# Patient Record
Sex: MALE | Race: BLACK OR AFRICAN AMERICAN | NOT HISPANIC OR LATINO | Employment: STUDENT | URBAN - METROPOLITAN AREA
[De-identification: names, ages, dates, MRNs, and addresses within clinical notes are randomized per-mention and may not be internally consistent; named-entity substitution may affect disease eponyms.]

---

## 2017-01-11 ENCOUNTER — TELEPHONE (OUTPATIENT)
Dept: FAMILY MEDICINE CLINIC | Facility: CLINIC | Age: 22
End: 2017-01-11

## 2017-01-18 ENCOUNTER — OFFICE VISIT (OUTPATIENT)
Dept: FAMILY MEDICINE CLINIC | Facility: CLINIC | Age: 22
End: 2017-01-18

## 2017-01-18 VITALS
SYSTOLIC BLOOD PRESSURE: 110 MMHG | WEIGHT: 181 LBS | DIASTOLIC BLOOD PRESSURE: 70 MMHG | HEART RATE: 69 BPM | HEIGHT: 75 IN | OXYGEN SATURATION: 97 % | BODY MASS INDEX: 22.5 KG/M2

## 2017-01-18 DIAGNOSIS — L64.9 MALE PATTERN BALDNESS: Primary | ICD-10-CM

## 2017-01-18 PROCEDURE — 99203 OFFICE O/P NEW LOW 30 MIN: CPT | Performed by: PHYSICIAN ASSISTANT

## 2017-01-18 RX ORDER — FINASTERIDE 1 MG/1
1 TABLET, FILM COATED ORAL DAILY
Qty: 30 TABLET | Refills: 2 | Status: SHIPPED | OUTPATIENT
Start: 2017-01-18 | End: 2022-09-06

## 2017-01-18 NOTE — MR AVS SNAPSHOT
Quinn Ugarte   2017 1:30 PM   Office Visit    Dept Phone:  [phone]   Encounter #:  [id number]    Provider:  TAMANNA Wong   Department:  [facility] MEDICAL [facility] FAMILY MEDICINE                Your Full Care Plan              Today's Medication Changes          These changes are accurate as of: 17  3:09 PM.  If you have any questions, ask your nurse or doctor.               New Medication(s)Ordered:     finasteride 1 MG tablet   Commonly known as:  PROPECIA   Take 1 tablet by mouth Daily.   Started by:  TAMANNA Wong            Where to Get Your Medications      These medications were sent to [address] - [address] [facility] - [phone]  - [phone] [address] [address]     Phone:  [phone]     finasteride 1 MG tablet                  Your Updated Medication List          This list is accurate as of: 17  3:09 PM.  Always use your most recent med list.                finasteride 1 MG tablet   Commonly known as:  PROPECIA   Take 1 tablet by mouth Daily.               You Were Diagnosed With        Codes Comments    Male pattern baldness    -  Primary ICD-10-CM: L64.9  ICD-9-CM: 704.09       Instructions     None    Patient Instructions History      Upcoming Appointments     Visit Type Date Time Department    NEW PATIENT 2017  1:30 PM [facility]      [employer] Signup     [facility] [employer] allows you to send messages to your doctor, view your test results, renew your prescriptions, schedule appointments, and more. To sign up, go to [employer] and click on the Sign Up Now link in the New User? box. Enter your [employer] Activation Code exactly as it appears below along with the last four digits of your Social Security Number and your Date of Birth () to complete the sign-up process. If you do not sign up before the expiration date, you must  "request a new code.    [employer] Activation Code: [email or web address]  Expires: 2/1/2017  3:09 PM    If you have questions, you can email [email or web address] or call [phone] to talk to our [employer] staff. Remember, [employer] is NOT to be used for urgent needs. For medical emergencies, dial 911.               Other Info from Your Visit           Allergies     No Known Allergies      Reason for Visit     Establish Care     Alopecia           Vital Signs     Blood Pressure Pulse Height Weight Oxygen Saturation Body Mass Index    110/70 (BP Location: Right arm, Patient Position: Sitting) 69 75\" (190.5 cm) 181 lb (82.1 kg) 97% 22.62 kg/m2    Smoking Status                   Never Smoker           Problems and Diagnoses Noted     Male pattern baldness    -  Primary        "

## 2017-01-18 NOTE — PROGRESS NOTES
Subjective   Quinn Ugarte is a 21 y.o. male    History of Present Illness  Patient is 21-year-old male comes in complaining of her loss, noticed around age 18 started losing her around the crown  of his head.  He states this is been increasingly getting worse over the last 3 years.  His father started losing his hair at the same time, he comes in today wanting to discuss options for her loss.  He's used no over-the-counter medication for her loss.  He complains of noticeable thinning of hair on top of head, decreasing hair line.  Patient denies any medical problems, no hypertension or diabetes, thyroid problems.          The following portions of the patient's history were reviewed and updated as appropriate: allergies, current medications, past social history and problem list    Review of Systems   Constitutional: Negative for fatigue and unexpected weight change.   Respiratory: Negative for cough, chest tightness and shortness of breath.    Cardiovascular: Negative for chest pain, palpitations and leg swelling.   Gastrointestinal: Negative for nausea.   Skin: Negative for color change and rash.   Neurological: Negative for dizziness, syncope, weakness and headaches.       Objective     Vitals:    01/18/17 1444   BP: 110/70   Pulse: 69   SpO2: 97%       Physical Exam   Constitutional: He appears well-developed and well-nourished.   HENT:   Head:       Neck: No JVD present.   Cardiovascular: Normal rate, regular rhythm, normal heart sounds, intact distal pulses and normal pulses.    No murmur heard.  Pulmonary/Chest: Effort normal and breath sounds normal. No respiratory distress.   Abdominal: Soft. Bowel sounds are normal. There is no hepatosplenomegaly. There is no tenderness.   Musculoskeletal: He exhibits no edema.   Skin: Skin is warm and dry.   Nursing note and vitals reviewed.      Assessment/Plan     Diagnoses and all orders for this visit:    Male pattern baldness    Other orders  -     finasteride  (PROPECIA) 1 MG tablet; Take 1 tablet by mouth Daily.     patient follow-up in 8 weeks, risks of medication discussed patient understands he is to follow-up, pictures taken in the office (8 weeks

## 2017-03-15 ENCOUNTER — TELEPHONE (OUTPATIENT)
Dept: FAMILY MEDICINE CLINIC | Facility: CLINIC | Age: 22
End: 2017-03-15

## 2022-09-06 ENCOUNTER — OFFICE VISIT (OUTPATIENT)
Dept: FAMILY MEDICINE CLINIC | Facility: CLINIC | Age: 27
End: 2022-09-06

## 2022-09-06 VITALS
HEART RATE: 97 BPM | SYSTOLIC BLOOD PRESSURE: 122 MMHG | OXYGEN SATURATION: 97 % | DIASTOLIC BLOOD PRESSURE: 76 MMHG | BODY MASS INDEX: 31.56 KG/M2 | WEIGHT: 253.8 LBS | HEIGHT: 75 IN

## 2022-09-06 DIAGNOSIS — Z13.0 SCREENING FOR DEFICIENCY ANEMIA: ICD-10-CM

## 2022-09-06 DIAGNOSIS — Z13.220 SCREENING FOR HYPERLIPIDEMIA: ICD-10-CM

## 2022-09-06 DIAGNOSIS — L64.9 MALE PATTERN BALDNESS: ICD-10-CM

## 2022-09-06 DIAGNOSIS — N52.9 ERECTILE DYSFUNCTION, UNSPECIFIED ERECTILE DYSFUNCTION TYPE: ICD-10-CM

## 2022-09-06 DIAGNOSIS — R53.81 MALAISE: ICD-10-CM

## 2022-09-06 DIAGNOSIS — Z13.29 SCREENING FOR ENDOCRINE DISORDER: ICD-10-CM

## 2022-09-06 DIAGNOSIS — R68.82 LOW LIBIDO: Primary | ICD-10-CM

## 2022-09-06 PROCEDURE — 99204 OFFICE O/P NEW MOD 45 MIN: CPT | Performed by: FAMILY MEDICINE

## 2022-09-06 RX ORDER — FINASTERIDE 1 MG/1
1 TABLET, FILM COATED ORAL DAILY
Qty: 90 TABLET | Refills: 2 | Status: SHIPPED | OUTPATIENT
Start: 2022-09-06

## 2022-09-06 NOTE — PROGRESS NOTES
New Patient Office Visit      Patient Name: Quinn Ugarte  : 1995   MRN: [id number]     Chief Complaint:    Chief Complaint   Patient presents with   • testosterone   • Erectile Dysfunction       Subjective   History of Present Illness    History of Present Illness: Quinn Ugarte is a 27 y.o. male who is here today to establish care.    Quinn Ugarte is a 25-year-old male who presents today to establish care. He has not been to a doctor in a few years.    He reports he has been having a sexual issue for a while. He has a girlfriend and they have tried sex, but he cannot function at all. He reports that even pornography does not give him an erection. He denies having morning erections. He reports this has been going on since at least 2022. He reports he was talking to a lot of guys at the gym and they told him it was probably low testosterone because he used to take a lot of SARMs. He denies noticing any swelling or shrinkage in his testicles. He reports his energy level has been down. He reports he has not been motivated to work out or go to the gym. He reports he has been feeling tired lately. He reports he has been sleeping a lot more than usual. He reports he has picked up a lot of weight over the last few years. He reports he has been picking up more fat than usual this year. He reports he has been eating a lot, sleeping a lot, and has not been feeling motivated to do anything. He reports he was having the same problems in 2022 when he was at the emergency room. He reports he was working out and his heart was beating real fast and he was sweating like he was about to have a heart attack. He reports he was sweating more than usual, went to the bathroom, and was vomiting. He reports he was having the erectile dysfunction at that point.    He denies smoking. He reports he drinks alcohol on the weekends, but nothing daily. He reports he drinks more than 10 drinks on the weekends. He reports  he goes to the bars every other weekend.     He reports he sleeps more than usual. He does not get restful sleep during the night. He reports he works most of the time. He is unsure if he snores. He reports he feels like there is a little depression going on.     He reports he has had problems with erections in the past, but he thinks it is from the SARMs. He reports he does not need to be on finasteride again.    Previous primary care: Previously saw Richi Tillman, most recently in 2017.    Chronic health conditions:  Male pattern baldness: Previously on finasteride 1 mg daily    Other physicians currently involved in patient's care:  Patient Care Team:  Cole Israel DO as PCP - General (Family Medicine)    Acute Concerns:  HPI  This patient is accompanied by their self who contributes to the history of their care.    The following portions of the patient's history were reviewed and updated as appropriate: allergies, current medications, past family history, past medical history, past social history, past surgical history and problem list.    Subjective      Review of Systems:   Review of Systems - See HPI and new patient paperwork scanned into chart    Past Medical History:   Past Medical History:   Diagnosis Date   • Depression        Past Surgical History: History reviewed. No pertinent surgical history.    Family History:   Family History   Problem Relation Age of Onset   • Drug abuse Mother    • No Known Problems Father        Social History:   Social History     Socioeconomic History   • Marital status: Single   Tobacco Use   • Smoking status: Never Smoker   • Smokeless tobacco: Never Used   Vaping Use   • Vaping Use: Never used   Substance and Sexual Activity   • Alcohol use: Yes     Comment: Occ   • Drug use: No   • Sexual activity: Defer       Tobacco History:   Social History     Tobacco Use   Smoking Status Never Smoker   Smokeless Tobacco Never Used       Medications:     Current Outpatient  "Medications:   •  finasteride (Propecia) 1 MG tablet, Take 1 tablet by mouth Daily., Disp: 90 tablet, Rfl: 2    Allergies:   No Known Allergies    Objective   Objective     Physical Exam:  Vital Signs:   Vitals:    09/06/22 1432   BP: 122/76   Pulse: 97   SpO2: 97%   Weight: 115 kg (253 lb 12.8 oz)   Height: 190.5 cm (75\")     Body mass index is 31.72 kg/m².     Physical Exam  Nursing note reviewed  Const: NAD, A&Ox4, Pleasant, Cooperative  Eyes: EOMI, no conjunctivitis  ENT: No nasal discharge present, neck supple  Cardiac: Regular rate and rhythm, no cyanosis  Resp: Respiratory rate within normal limits, no increased work of breathing, no audible wheezing or retractions noted  GI: No distention or ascites  MSK: Motor and sensation grossly intact in bilateral upper extremities  Neurologic: CN II-XII grossly intact  Psych: Appropriate mood and behavior.  Skin: Warm, dry  Procedures/Radiology     Procedures  No radiology results for the last 7 days     Assessment & Plan   Assessment / Plan      Assessment/Plan:   Problems Addressed This Visit  Diagnoses and all orders for this visit:    1. Low libido (Primary)  -     Testosterone; Future  -     Prolactin; Future  -     TSH Rfx On Abnormal To Free T4; Future  -     Urinalysis With Microscopic If Indicated (No Culture) - Urine, Clean Catch; Future  -     Lipid Panel; Future  -     Hemoglobin A1c; Future  -     Comprehensive Metabolic Panel; Future  -     CBC & Differential; Future  -     Vitamin D 25 Hydroxy; Future  -     Vitamin B12; Future  -     PSA DIAGNOSTIC; Future    2. Malaise  -     Testosterone; Future  -     Prolactin; Future  -     TSH Rfx On Abnormal To Free T4; Future  -     Urinalysis With Microscopic If Indicated (No Culture) - Urine, Clean Catch; Future  -     Lipid Panel; Future  -     Hemoglobin A1c; Future  -     Comprehensive Metabolic Panel; Future  -     CBC & Differential; Future  -     Vitamin D 25 Hydroxy; Future  -     Vitamin B12; Future  - "     PSA DIAGNOSTIC; Future    3. Erectile dysfunction, unspecified erectile dysfunction type  -     Testosterone; Future  -     Prolactin; Future  -     TSH Rfx On Abnormal To Free T4; Future  -     Urinalysis With Microscopic If Indicated (No Culture) - Urine, Clean Catch; Future  -     Lipid Panel; Future  -     Hemoglobin A1c; Future  -     Comprehensive Metabolic Panel; Future  -     CBC & Differential; Future  -     Vitamin D 25 Hydroxy; Future  -     Vitamin B12; Future  -     PSA DIAGNOSTIC; Future    4. Screening for deficiency anemia    5. Screening for endocrine disorder    6. Screening for hyperlipidemia    7. Male pattern baldness  -     finasteride (Propecia) 1 MG tablet; Take 1 tablet by mouth Daily.  Dispense: 90 tablet; Refill: 2      Problem List Items Addressed This Visit    None     Visit Diagnoses     Low libido    -  Primary    Relevant Orders    Testosterone (Completed)    Prolactin (Completed)    TSH Rfx On Abnormal To Free T4 (Completed)    Urinalysis With Microscopic If Indicated (No Culture) - Urine, Clean Catch (Completed)    Lipid Panel (Completed)    Hemoglobin A1c (Completed)    Comprehensive Metabolic Panel (Completed)    CBC & Differential (Completed)    Vitamin D 25 Hydroxy (Completed)    Vitamin B12 (Completed)    PSA DIAGNOSTIC (Completed)    Malaise        Relevant Orders    Testosterone (Completed)    Prolactin (Completed)    TSH Rfx On Abnormal To Free T4 (Completed)    Urinalysis With Microscopic If Indicated (No Culture) - Urine, Clean Catch (Completed)    Lipid Panel (Completed)    Hemoglobin A1c (Completed)    Comprehensive Metabolic Panel (Completed)    CBC & Differential (Completed)    Vitamin D 25 Hydroxy (Completed)    Vitamin B12 (Completed)    PSA DIAGNOSTIC (Completed)    Erectile dysfunction, unspecified erectile dysfunction type        Relevant Orders    Testosterone (Completed)    Prolactin (Completed)    TSH Rfx On Abnormal To Free T4 (Completed)    Urinalysis  With Microscopic If Indicated (No Culture) - Urine, Clean Catch (Completed)    Lipid Panel (Completed)    Hemoglobin A1c (Completed)    Comprehensive Metabolic Panel (Completed)    CBC & Differential (Completed)    Vitamin D 25 Hydroxy (Completed)    Vitamin B12 (Completed)    PSA DIAGNOSTIC (Completed)    Screening for deficiency anemia        Screening for endocrine disorder        Screening for hyperlipidemia        Male pattern baldness        Relevant Medications    finasteride (Propecia) 1 MG tablet          1. Low libido.  - He has symptoms possibly consistent with low testosterone, also consistent with a number of other issues including depression, could be consistent with diabetes or severe hyperlipidemia. His labs in 05/2022 generally looked okay. He does state that he has gained a lot of weight over the last few years. In 2017, he was at 181 pounds, today he is at 253 pounds. He is also taking over-the-counter supplements called SARMs, which will require some research as it relates to his current symptoms. Lab orders have been placed. He will follow up in a couple of weeks to go over his lab results and discuss the plan.    We will plan to obtain previous records both for chronic preventative care as well as those related to the current episode of care.  Any records that the patient brought with him today were reviewed personally by me during the visit today and will be scanned into the chart for posterity.    Discussed the nature of the disease including relevant anatomy & expected clinical course, risks, complications, implications, management, safe and proper use of medications. Plan of care reviewed with patient at the conclusion of today's visit. Education was provided regarding diagnosis and management.  Patient verbalizes understanding of and agreement with management plan. Encouraged therapeutic lifestyle changes including low calorie diet with plenty of fruits and vegetables, daily exercise,  medication compliance, and keeping scheduled follow up appointments with me and any other providers. Encouraged patient to have appointment for complete physical, fasting labs, appropriate screenings, and immunizations on an annual basis. Discussed extended office hours, shared call, and appropriate use of the ER. Discussed generally we do not prescribe chronic controlled substances from this office. Appropriate referrals will be made to pain management and psychiatry if needed. Stressed the importance and expectation of medical compliance with plan of care, medications, and follow up appointments.    Patient Instructions   Sarms      Follow Up:   Return in about 2 weeks (around 9/20/2022) for video visit.    [facility]     DO AKANKSHA Gonzalez RD  [facility]IMARY [facility]  [address] [facility] [address]  Fax [phone]  Phone [phone]      Transcribed from ambient dictation for Cole Israel DO by BECKY VILLANUEVA.  09/06/22   15:45 EDT    Patient verbalized consent to the visit recording.  I have personally performed the services described in this document as transcribed by the above individual, and it is both accurate and complete.  Cole Israel DO  9/14/2022  11:03 EDT

## 2022-09-07 ENCOUNTER — LAB (OUTPATIENT)
Dept: LAB | Facility: HOSPITAL | Age: 27
End: 2022-09-07

## 2022-09-07 DIAGNOSIS — R68.82 LOW LIBIDO: ICD-10-CM

## 2022-09-07 DIAGNOSIS — R53.81 MALAISE: ICD-10-CM

## 2022-09-07 DIAGNOSIS — N52.9 ERECTILE DYSFUNCTION, UNSPECIFIED ERECTILE DYSFUNCTION TYPE: ICD-10-CM

## 2022-09-07 LAB
25(OH)D3 SERPL-MCNC: 21.7 NG/ML (ref 30–100)
ALBUMIN SERPL-MCNC: 4.4 G/DL (ref 3.5–5.2)
ALBUMIN/GLOB SERPL: 1.4 G/DL
ALP SERPL-CCNC: 74 U/L (ref 39–117)
ALT SERPL W P-5'-P-CCNC: 25 U/L (ref 1–41)
ANION GAP SERPL CALCULATED.3IONS-SCNC: 10.7 MMOL/L (ref 5–15)
AST SERPL-CCNC: 19 U/L (ref 1–40)
BACTERIA UR QL AUTO: ABNORMAL /HPF
BASOPHILS # BLD AUTO: 0.09 10*3/MM3 (ref 0–0.2)
BASOPHILS NFR BLD AUTO: 0.9 % (ref 0–1.5)
BILIRUB SERPL-MCNC: 0.8 MG/DL (ref 0–1.2)
BILIRUB UR QL STRIP: NEGATIVE
BUN SERPL-MCNC: 13 MG/DL (ref 6–20)
BUN/CREAT SERPL: 10.2 (ref 7–25)
CALCIUM SPEC-SCNC: 10 MG/DL (ref 8.6–10.5)
CHLORIDE SERPL-SCNC: 99 MMOL/L (ref 98–107)
CHOLEST SERPL-MCNC: 148 MG/DL (ref 0–200)
CLARITY UR: CLEAR
CO2 SERPL-SCNC: 27.3 MMOL/L (ref 22–29)
COD CRY URNS QL: ABNORMAL /HPF
COLOR UR: ABNORMAL
CREAT SERPL-MCNC: 1.27 MG/DL (ref 0.76–1.27)
DEPRECATED RDW RBC AUTO: 41.6 FL (ref 37–54)
EGFRCR SERPLBLD CKD-EPI 2021: 79.4 ML/MIN/1.73
EOSINOPHIL # BLD AUTO: 0.35 10*3/MM3 (ref 0–0.4)
EOSINOPHIL NFR BLD AUTO: 3.7 % (ref 0.3–6.2)
ERYTHROCYTE [DISTWIDTH] IN BLOOD BY AUTOMATED COUNT: 12.7 % (ref 12.3–15.4)
GLOBULIN UR ELPH-MCNC: 3.1 GM/DL
GLUCOSE SERPL-MCNC: 86 MG/DL (ref 65–99)
GLUCOSE UR STRIP-MCNC: NEGATIVE MG/DL
HCT VFR BLD AUTO: 44.4 % (ref 37.5–51)
HDLC SERPL-MCNC: 52 MG/DL (ref 40–60)
HGB BLD-MCNC: 15.3 G/DL (ref 13–17.7)
HGB UR QL STRIP.AUTO: NEGATIVE
HYALINE CASTS UR QL AUTO: ABNORMAL /LPF
IMM GRANULOCYTES # BLD AUTO: 0.04 10*3/MM3 (ref 0–0.05)
IMM GRANULOCYTES NFR BLD AUTO: 0.4 % (ref 0–0.5)
KETONES UR QL STRIP: ABNORMAL
LDLC SERPL CALC-MCNC: 73 MG/DL (ref 0–100)
LDLC/HDLC SERPL: 1.34 {RATIO}
LEUKOCYTE ESTERASE UR QL STRIP.AUTO: NEGATIVE
LYMPHOCYTES # BLD AUTO: 2.87 10*3/MM3 (ref 0.7–3.1)
LYMPHOCYTES NFR BLD AUTO: 30 % (ref 19.6–45.3)
MCH RBC QN AUTO: 30.8 PG (ref 26.6–33)
MCHC RBC AUTO-ENTMCNC: 34.5 G/DL (ref 31.5–35.7)
MCV RBC AUTO: 89.5 FL (ref 79–97)
MONOCYTES # BLD AUTO: 0.65 10*3/MM3 (ref 0.1–0.9)
MONOCYTES NFR BLD AUTO: 6.8 % (ref 5–12)
NEUTROPHILS NFR BLD AUTO: 5.57 10*3/MM3 (ref 1.7–7)
NEUTROPHILS NFR BLD AUTO: 58.2 % (ref 42.7–76)
NITRITE UR QL STRIP: NEGATIVE
NRBC BLD AUTO-RTO: 0 /100 WBC (ref 0–0.2)
PH UR STRIP.AUTO: 5.5 [PH] (ref 5–8)
PLATELET # BLD AUTO: 340 10*3/MM3 (ref 140–450)
PMV BLD AUTO: 9.9 FL (ref 6–12)
POTASSIUM SERPL-SCNC: 4.4 MMOL/L (ref 3.5–5.2)
PROLACTIN SERPL-MCNC: 17.1 NG/ML (ref 4.04–15.2)
PROT SERPL-MCNC: 7.5 G/DL (ref 6–8.5)
PROT UR QL STRIP: ABNORMAL
PSA SERPL-MCNC: 0.32 NG/ML (ref 0–4)
RBC # BLD AUTO: 4.96 10*6/MM3 (ref 4.14–5.8)
RBC # UR STRIP: ABNORMAL /HPF
REF LAB TEST METHOD: ABNORMAL
SODIUM SERPL-SCNC: 137 MMOL/L (ref 136–145)
SP GR UR STRIP: >=1.03 (ref 1–1.03)
SQUAMOUS #/AREA URNS HPF: ABNORMAL /HPF
TESTOST SERPL-MCNC: 275 NG/DL (ref 249–836)
TRIGL SERPL-MCNC: 132 MG/DL (ref 0–150)
TSH SERPL DL<=0.05 MIU/L-ACNC: 2.16 UIU/ML (ref 0.27–4.2)
UROBILINOGEN UR QL STRIP: ABNORMAL
VIT B12 BLD-MCNC: 611 PG/ML (ref 211–946)
VLDLC SERPL-MCNC: 23 MG/DL (ref 5–40)
WBC # UR STRIP: ABNORMAL /HPF
WBC NRBC COR # BLD: 9.57 10*3/MM3 (ref 3.4–10.8)

## 2022-09-07 PROCEDURE — 80050 GENERAL HEALTH PANEL: CPT

## 2022-09-07 PROCEDURE — 80061 LIPID PANEL: CPT

## 2022-09-07 PROCEDURE — 84146 ASSAY OF PROLACTIN: CPT

## 2022-09-07 PROCEDURE — 82607 VITAMIN B-12: CPT

## 2022-09-07 PROCEDURE — 84403 ASSAY OF TOTAL TESTOSTERONE: CPT

## 2022-09-07 PROCEDURE — 82306 VITAMIN D 25 HYDROXY: CPT

## 2022-09-07 PROCEDURE — 83036 HEMOGLOBIN GLYCOSYLATED A1C: CPT

## 2022-09-07 PROCEDURE — 81001 URINALYSIS AUTO W/SCOPE: CPT

## 2022-09-07 PROCEDURE — 84153 ASSAY OF PSA TOTAL: CPT

## 2022-09-08 LAB — HBA1C MFR BLD: 5.4 % (ref 4.8–5.6)

## 2022-09-23 ENCOUNTER — TELEMEDICINE (OUTPATIENT)
Dept: FAMILY MEDICINE CLINIC | Facility: CLINIC | Age: 27
End: 2022-09-23

## 2022-09-23 DIAGNOSIS — R68.82 LOW LIBIDO: Primary | ICD-10-CM

## 2022-09-23 DIAGNOSIS — E55.9 VITAMIN D DEFICIENCY: ICD-10-CM

## 2022-09-23 DIAGNOSIS — R79.89 ELEVATED PROLACTIN LEVEL: ICD-10-CM

## 2022-09-23 PROCEDURE — 99213 OFFICE O/P EST LOW 20 MIN: CPT | Performed by: FAMILY MEDICINE

## 2022-09-23 RX ORDER — ERGOCALCIFEROL 1.25 MG/1
50000 CAPSULE ORAL WEEKLY
Qty: 12 CAPSULE | Refills: 0 | Status: SHIPPED | OUTPATIENT
Start: 2022-09-23 | End: 2022-10-19

## 2022-09-26 NOTE — PROGRESS NOTES
Subjective   Quinn Ugarte is a 27 y.o. male.     Chief Complaint   Patient presents with   • Follow-up     Low libido, lab review       History of Present Illness     Quinn Ugarte presents today for   Chief Complaint   Patient presents with   • Follow-up     Low libido, lab review       Quinn is seen today via video visit for follow-up on labs.  He confirms that he is located in the [facility].  He has no new complaints since he established care on 9/6.    You have chosen to receive care through a telehealth visit.  Do you consent to use a video/audio connection for your medical care today? Yes    The following portions of the patient's history were reviewed and updated as appropriate: allergies, current medications, past family history, past medical history, past social history, past surgical history and problem list.    Active Ambulatory Problems     Diagnosis Date Noted   • No Active Ambulatory Problems     Resolved Ambulatory Problems     Diagnosis Date Noted   • No Resolved Ambulatory Problems     Past Medical History:   Diagnosis Date   • Depression      No past surgical history on file.  Family History   Problem Relation Age of Onset   • Drug abuse Mother    • No Known Problems Father      Social History     Socioeconomic History   • Marital status: Single   Tobacco Use   • Smoking status: Never Smoker   • Smokeless tobacco: Never Used   Vaping Use   • Vaping Use: Never used   Substance and Sexual Activity   • Alcohol use: Yes     Comment: Occ   • Drug use: No   • Sexual activity: Defer       Review of Systems  Review of Systems -  General ROS: negative for - chills, fever or night sweats  Cardiovascular ROS: no chest pain or dyspnea on exertion  Gastrointestinal ROS: no abdominal pain, change in bowel habits, or black or bloody stools  Genito-Urinary ROS: no dysuria, trouble voiding, or hematuria    Objective   There were no vitals taken for this visit.  Vitals obtained from patient if  available  Physical Exam  Const: Non-toxic appearing, NAD, A&Ox4, Pleasant, Cooperative  Eyes: EOMI, no conjunctivitis  ENT: No copious nasal drainage noted  Cardiac: Regular rate by pulse  Resp: Respiratory rate observed to be within normal limits, no increased work of breathing observed, no audible wheezing or cough noted  Psych: Appropriate mood and behavior.  Procedures  Assessment & Plan   Problem List Items Addressed This Visit    None     Visit Diagnoses     Low libido    -  Primary    Relevant Orders    Testosterone    Testosterone Free Direct    Follicle stimulating hormone    Luteinizing hormone    Prolactin    Elevated prolactin level        Relevant Orders    Testosterone Free Direct    Follicle stimulating hormone    Luteinizing hormone    Prolactin    Vitamin D deficiency        Relevant Medications    vitamin D (ERGOCALCIFEROL) 1.25 MG (94482 UT) capsule capsule        His prolactin level is slightly high, which coupled with his low libido does raise some slight concern.  Recommended repeating hormone levels including testosterone and prolactin.    His vitamin D level was low, recommended 12 weeks of ergocalciferol 50,000 units weekly.  We will repeat vitamin D level in 3 months.    See patient diagnoses and orders along with patient instructions for assessment, plan, and changes to care for patient.    This visit was conducted via telemedicine with live video and audio provided through Video Options: MyChart/Zoom at the point of care.    There are no Patient Instructions on file for this visit.    Return in about 3 months (around 12/23/2022) for (non-fasting blood work 1 week prior to appt).    Ambulatory progress note signed and attested to by Cole Israel D.O.

## 2022-10-14 DIAGNOSIS — E55.9 VITAMIN D DEFICIENCY: ICD-10-CM

## 2022-10-19 RX ORDER — ERGOCALCIFEROL 1.25 MG/1
CAPSULE ORAL
Qty: 12 CAPSULE | Refills: 0 | Status: SHIPPED | OUTPATIENT
Start: 2022-10-19

## 2025-08-01 ENCOUNTER — OFFICE VISIT (OUTPATIENT)
Dept: FAMILY MEDICINE CLINIC | Facility: CLINIC | Age: 30
End: 2025-08-01
Payer: COMMERCIAL

## 2025-08-01 VITALS
HEART RATE: 88 BPM | SYSTOLIC BLOOD PRESSURE: 122 MMHG | OXYGEN SATURATION: 99 % | BODY MASS INDEX: 32.87 KG/M2 | DIASTOLIC BLOOD PRESSURE: 80 MMHG | HEIGHT: 75 IN | WEIGHT: 264.4 LBS

## 2025-08-01 DIAGNOSIS — N48.5 PENILE ULCER: ICD-10-CM

## 2025-08-01 DIAGNOSIS — Z11.3 SCREENING FOR STD (SEXUALLY TRANSMITTED DISEASE): Primary | ICD-10-CM

## 2025-08-01 PROCEDURE — 87491 CHLMYD TRACH DNA AMP PROBE: CPT | Performed by: FAMILY MEDICINE

## 2025-08-01 PROCEDURE — 87661 TRICHOMONAS VAGINALIS AMPLIF: CPT | Performed by: FAMILY MEDICINE

## 2025-08-01 PROCEDURE — 87591 N.GONORRHOEAE DNA AMP PROB: CPT | Performed by: FAMILY MEDICINE

## 2025-08-01 RX ORDER — FLUCONAZOLE 150 MG/1
150 TABLET ORAL ONCE
Qty: 1 TABLET | Refills: 0 | Status: SHIPPED | OUTPATIENT
Start: 2025-08-01 | End: 2025-08-01

## 2025-08-06 LAB
C TRACH RRNA SPEC QL NAA+PROBE: NEGATIVE
N GONORRHOEA RRNA SPEC QL NAA+PROBE: NEGATIVE
T VAGINALIS RRNA SPEC QL NAA+PROBE: NEGATIVE

## 2025-08-08 ENCOUNTER — OFFICE VISIT (OUTPATIENT)
Dept: FAMILY MEDICINE CLINIC | Facility: CLINIC | Age: 30
End: 2025-08-08
Payer: COMMERCIAL

## 2025-08-08 ENCOUNTER — LAB (OUTPATIENT)
Dept: LAB | Facility: HOSPITAL | Age: 30
End: 2025-08-08
Payer: COMMERCIAL

## 2025-08-08 VITALS
BODY MASS INDEX: 32.43 KG/M2 | HEIGHT: 75 IN | HEART RATE: 73 BPM | SYSTOLIC BLOOD PRESSURE: 126 MMHG | OXYGEN SATURATION: 98 % | WEIGHT: 260.8 LBS | DIASTOLIC BLOOD PRESSURE: 84 MMHG

## 2025-08-08 DIAGNOSIS — Z11.3 SCREENING FOR STD (SEXUALLY TRANSMITTED DISEASE): Primary | ICD-10-CM

## 2025-08-08 DIAGNOSIS — N48.5 PENILE ULCER: ICD-10-CM

## 2025-08-08 DIAGNOSIS — Z11.3 SCREENING FOR STD (SEXUALLY TRANSMITTED DISEASE): ICD-10-CM

## 2025-08-08 PROCEDURE — 86695 HERPES SIMPLEX TYPE 1 TEST: CPT

## 2025-08-08 PROCEDURE — G0432 EIA HIV-1/HIV-2 SCREEN: HCPCS

## 2025-08-08 PROCEDURE — 86592 SYPHILIS TEST NON-TREP QUAL: CPT

## 2025-08-08 PROCEDURE — 86696 HERPES SIMPLEX TYPE 2 TEST: CPT

## 2025-08-08 RX ORDER — FLUCONAZOLE 150 MG/1
150 TABLET ORAL ONCE
Qty: 1 TABLET | Refills: 0 | Status: SHIPPED | OUTPATIENT
Start: 2025-08-08 | End: 2025-08-08

## 2025-08-09 LAB
HIV 1+2 AB+HIV1 P24 AG SERPL QL IA: NORMAL
HSV1 IGG SERPL QL IA: NON REACTIVE
HSV2 IGG SERPL QL IA: REACTIVE
RPR SER QL: NORMAL